# Patient Record
Sex: FEMALE | Race: OTHER | NOT HISPANIC OR LATINO
[De-identification: names, ages, dates, MRNs, and addresses within clinical notes are randomized per-mention and may not be internally consistent; named-entity substitution may affect disease eponyms.]

---

## 2017-05-09 ENCOUNTER — OTHER (OUTPATIENT)
Age: 62
End: 2017-05-09

## 2017-05-09 RX ORDER — SODIUM PICOSULFATE, MAGNESIUM OXIDE, AND ANHYDROUS CITRIC ACID 10; 3.5; 12 MG/16.2G; G/16.2G; G/16.2G
10-3.5-12 POWDER, METERED ORAL
Qty: 1 | Refills: 0 | Status: ACTIVE | COMMUNITY
Start: 2017-05-09 | End: 1900-01-01

## 2017-05-15 ENCOUNTER — APPOINTMENT (OUTPATIENT)
Dept: SURGERY | Facility: CLINIC | Age: 62
End: 2017-05-15

## 2017-05-15 DIAGNOSIS — C73 MALIGNANT NEOPLASM OF THYROID GLAND: ICD-10-CM

## 2017-05-15 DIAGNOSIS — C22.9 MALIGNANT NEOPLASM OF LIVER, NOT SPECIFIED AS PRIMARY OR SECONDARY: ICD-10-CM

## 2017-05-15 DIAGNOSIS — Z78.9 OTHER SPECIFIED HEALTH STATUS: ICD-10-CM

## 2019-01-24 ENCOUNTER — APPOINTMENT (OUTPATIENT)
Dept: UROLOGY | Facility: CLINIC | Age: 64
End: 2019-01-24
Payer: COMMERCIAL

## 2019-01-24 DIAGNOSIS — N28.1 CYST OF KIDNEY, ACQUIRED: ICD-10-CM

## 2019-01-24 DIAGNOSIS — R35.0 FREQUENCY OF MICTURITION: ICD-10-CM

## 2019-01-24 PROCEDURE — 99204 OFFICE O/P NEW MOD 45 MIN: CPT | Mod: 25

## 2019-01-24 PROCEDURE — 51798 US URINE CAPACITY MEASURE: CPT

## 2019-01-25 LAB
APPEARANCE: CLEAR
BACTERIA: NEGATIVE
BILIRUBIN URINE: ABNORMAL
BLOOD URINE: NEGATIVE
CALCIUM OXALATE CRYSTALS: ABNORMAL
COLOR: ABNORMAL
GLUCOSE QUALITATIVE U: NEGATIVE MG/DL
HYALINE CASTS: 6 /LPF
KETONES URINE: NEGATIVE
LEUKOCYTE ESTERASE URINE: ABNORMAL
MICROSCOPIC-UA: NORMAL
NITRITE URINE: POSITIVE
PH URINE: 5.5
PROTEIN URINE: 30 MG/DL
RED BLOOD CELLS URINE: 2 /HPF
SPECIFIC GRAVITY URINE: 1.03
SQUAMOUS EPITHELIAL CELLS: 2 /HPF
UROBILINOGEN URINE: 1 MG/DL
WHITE BLOOD CELLS URINE: 1 /HPF

## 2019-01-26 LAB — BACTERIA UR CULT: NORMAL

## 2019-02-14 ENCOUNTER — APPOINTMENT (OUTPATIENT)
Dept: UROLOGY | Facility: CLINIC | Age: 64
End: 2019-02-14
Payer: COMMERCIAL

## 2019-02-14 ENCOUNTER — OUTPATIENT (OUTPATIENT)
Dept: OUTPATIENT SERVICES | Facility: HOSPITAL | Age: 64
LOS: 1 days | End: 2019-02-14
Payer: COMMERCIAL

## 2019-02-14 VITALS
BODY MASS INDEX: 26.67 KG/M2 | DIASTOLIC BLOOD PRESSURE: 79 MMHG | RESPIRATION RATE: 16 BRPM | SYSTOLIC BLOOD PRESSURE: 143 MMHG | WEIGHT: 176 LBS | HEART RATE: 56 BPM | HEIGHT: 68 IN | TEMPERATURE: 98.3 F

## 2019-02-14 DIAGNOSIS — D30.00 BENIGN NEOPLASM OF UNSPECIFIED KIDNEY: ICD-10-CM

## 2019-02-14 DIAGNOSIS — R35.0 FREQUENCY OF MICTURITION: ICD-10-CM

## 2019-02-14 PROCEDURE — 99214 OFFICE O/P EST MOD 30 MIN: CPT | Mod: 25

## 2019-02-14 PROCEDURE — 52000 CYSTOURETHROSCOPY: CPT

## 2019-02-15 DIAGNOSIS — D30.00 BENIGN NEOPLASM OF UNSPECIFIED KIDNEY: ICD-10-CM

## 2019-02-15 LAB
APPEARANCE: CLEAR
BACTERIA: NEGATIVE
BILIRUBIN URINE: NEGATIVE
BLOOD URINE: NEGATIVE
COLOR: YELLOW
GLUCOSE QUALITATIVE U: NEGATIVE MG/DL
HYALINE CASTS: 1 /LPF
KETONES URINE: NEGATIVE
LEUKOCYTE ESTERASE URINE: NEGATIVE
MICROSCOPIC-UA: NORMAL
NITRITE URINE: NEGATIVE
PH URINE: 5.5
PROTEIN URINE: NEGATIVE MG/DL
RED BLOOD CELLS URINE: 4 /HPF
SPECIFIC GRAVITY URINE: 1.03
SQUAMOUS EPITHELIAL CELLS: 11 /HPF
UROBILINOGEN URINE: NEGATIVE MG/DL
WHITE BLOOD CELLS URINE: 5 /HPF

## 2019-02-24 LAB — BACTERIA UR CULT: NORMAL

## 2024-02-22 ENCOUNTER — APPOINTMENT (OUTPATIENT)
Dept: SURGERY | Facility: CLINIC | Age: 69
End: 2024-02-22
Payer: MEDICARE

## 2024-02-22 VITALS
HEART RATE: 82 BPM | HEIGHT: 68 IN | BODY MASS INDEX: 28.64 KG/M2 | WEIGHT: 189 LBS | TEMPERATURE: 97.1 F | SYSTOLIC BLOOD PRESSURE: 120 MMHG | RESPIRATION RATE: 16 BRPM | DIASTOLIC BLOOD PRESSURE: 79 MMHG | OXYGEN SATURATION: 99 %

## 2024-02-22 DIAGNOSIS — C18.9 MALIGNANT NEOPLASM OF COLON, UNSPECIFIED: ICD-10-CM

## 2024-02-22 DIAGNOSIS — G25.81 RESTLESS LEGS SYNDROME: ICD-10-CM

## 2024-02-22 PROCEDURE — 99203 OFFICE O/P NEW LOW 30 MIN: CPT

## 2024-02-22 RX ORDER — OMEPRAZOLE 20 MG/1
TABLET, ORALLY DISINTEGRATING, DELAYED RELEASE ORAL
Refills: 0 | Status: ACTIVE | COMMUNITY

## 2024-02-22 NOTE — HISTORY OF PRESENT ILLNESS
[FreeTextEntry1] : Jennifer is a 69 y/o female here for consultation, abdominal hernia  h/o Stage IV Colon cancer, Dx in 2005 s/p left hemicolectomy 11/2005 mets to liver s/p left lateral segmentectomy with placement of hepatic artery pump and cholecystectomy ([facility]" in 2009.  Adjuvant tx with pump and systemic chemotherapy with Folfox was completed by Dr. Meza in March 2010.  The hepatic artery pump was removed on 10/3/17.    Last seen by Dr. aDily 12/03/2007 due to right groin swelling and discomfort CT ordered  Colonoscopy 05/15/17 by Dr. Turner - Diverticulosis of the sigmoid colon.  Normal mucosa in the rectum, sigmoid colon, descending colon, transverse colon, ascending colon and cecum.    CT A/P 11/1/22 - Since April 20, 2022, slightly increased complex cystic right renal mass.  No metastatic disease.    US (renal mass) 11/3/23 - Since April 20, 23, unchanged cystic renal mass.    CT _______ will bring    [de-identified] : Jennifer is a 67 y/o female here for a consultation visit, abdominal pain.   H/O colon cancer Dx in 2005 s/p left colectomyh in 2005, developed liver metastasis, underwent a left lateral segmentectomy with placement of a hepatic artery pump (removed on 10/3/17) & Cholecystectomy ([facility]) in 2009.  Adjuvant tx  with pump and systemic chemo w FOLFOX was completed in 03/2010.    Colonoscopy 05/15/17 by Dr. Turner (Colon-CA, unspecified site) - Diverticulosis of the sigmoid colon.  Normal mucosa in the rectum, sigmoid colon, descending colon, transverse colon, ascending colon and cecum.    CT A/P 11/3/23 (colon cancer. evaluate extent of disease) - Since November 1, 2022, slight increase complex right renal mass.  No new suspicious findings.    US Renal/Retro/Bladder 11/3/23 (renal mass) - Since April 20, 2023, unchanged cystic right renal mass.

## 2024-02-22 NOTE — HISTORY OF PRESENT ILLNESS
[FreeTextEntry1] : Jennifer is a 69 y/o female here for consultation, abdominal hernia  h/o Stage IV Colon cancer, Dx in 2005 s/p left hemicolectomy 11/2005 mets to liver s/p left lateral segmentectomy with placement of hepatic artery pump and cholecystectomy ([facility]" in 2009.  Adjuvant tx with pump and systemic chemotherapy with Folfox was completed by Dr. Meza in March 2010.  The hepatic artery pump was removed on 10/3/17.    Last seen by Dr. Daily 12/03/2007 due to right groin swelling and discomfort CT ordered  Colonoscopy 05/15/17 by Dr. Turner - Diverticulosis of the sigmoid colon.  Normal mucosa in the rectum, sigmoid colon, descending colon, transverse colon, ascending colon and cecum.    CT A/P 11/1/22 - Since April 20, 2022, slightly increased complex cystic right renal mass.  No metastatic disease.    US (renal mass) 11/3/23 - Since April 20, 23, unchanged cystic renal mass.    CT _______ will bring    [de-identified] : Jennifer is a 69 y/o female here for a consultation visit, abdominal pain.   H/O colon cancer Dx in 2005 s/p left colectomyh in 2005, developed liver metastasis, underwent a left lateral segmentectomy with placement of a hepatic artery pump (removed on 10/3/17) & Cholecystectomy ([facility]) in 2009.  Adjuvant tx  with pump and systemic chemo w FOLFOX was completed in 03/2010.    Colonoscopy 05/15/17 by Dr. Turner (Colon-CA, unspecified site) - Diverticulosis of the sigmoid colon.  Normal mucosa in the rectum, sigmoid colon, descending colon, transverse colon, ascending colon and cecum.    CT A/P 11/3/23 (colon cancer. evaluate extent of disease) - Since November 1, 2022, slight increase complex right renal mass.  No new suspicious findings.    US Renal/Retro/Bladder 11/3/23 (renal mass) - Since April 20, 2023, unchanged cystic right renal mass.

## 2024-02-22 NOTE — ASSESSMENT
[FreeTextEntry1] : I have seen and evaluated patient and have corroborated all nursing input into this note.  Patient has had recent crampy abdominal pain which is improved.  She has a history of metastatic colon cancer and her last colonoscopy was 2017.  A recent CT scan was unremarkable except for the known septated renal cyst that is followed by oncology and urology.  I recommended a follow-up exam.  Indications, risks, benefits, alternatives reviewed including but not limited to bleeding, perforation, and incomplete exam.  All questions answered.

## 2024-02-22 NOTE — PHYSICAL EXAM
[Abdomen Tenderness] : ~T No ~M abdominal tenderness [Abdomen Masses] : No abdominal masses [de-identified] : Healed wounds.

## 2024-02-22 NOTE — PHYSICAL EXAM
[Abdomen Tenderness] : ~T No ~M abdominal tenderness [Abdomen Masses] : No abdominal masses [de-identified] : Healed wounds.

## 2024-02-25 NOTE — HISTORY OF PRESENT ILLNESS
[FreeTextEntry1] : Jennifer is a 67 y/o female here for a consultation visit, abdominal pain.  H/O colon cancer Dx in 2005 s/p left colectomyh in 2005, developed liver metastasis, underwent a left lateral segmentectomy with placement of a hepatic artery pump (removed on 10/3/17) & Cholecystectomy ([facility]) in 2009. Adjuvant tx with pump and systemic chemo w FOLFOX was completed in 03/2010.  Colonoscopy 05/15/17 by Dr. Turner (Colon-CA, unspecified site) - Diverticulosis of the sigmoid colon. Normal mucosa in the rectum, sigmoid colon, descending colon, transverse colon, ascending colon and cecum.  CT A/P 11/3/23 (colon cancer. evaluate extent of disease) - Since November 1, 2022, slight increase complex right renal mass. No new suspicious findings.  US Renal/Retro/Bladder 11/3/23 (renal mass) - Since April 20, 2023, unchanged cystic right renal mass.

## 2024-02-27 DIAGNOSIS — Z12.11 ENCOUNTER FOR SCREENING FOR MALIGNANT NEOPLASM OF COLON: ICD-10-CM

## 2024-02-27 RX ORDER — ATORVASTATIN CALCIUM 10 MG/1
10 TABLET, FILM COATED ORAL
Refills: 0 | Status: ACTIVE | COMMUNITY

## 2024-02-29 RX ORDER — SODIUM SULFATE, POTASSIUM SULFATE AND MAGNESIUM SULFATE 1.6; 3.13; 17.5 G/177ML; G/177ML; G/177ML
17.5-3.13-1.6 SOLUTION ORAL
Qty: 1 | Refills: 0 | Status: ACTIVE | COMMUNITY
Start: 2024-02-29 | End: 1900-01-01

## 2024-03-12 NOTE — REASON FOR VISIT
New patient to us established to the practice comes the office complaining of middle back pain between her shoulder blades right around the bra strap level.  It is kind of started after doing some physical therapy after her knee replacement.  She denies radiculopathy paresthesia fever chills nausea vomiting or night sweats.  Denies bowel or bladder complaints, denies any real trauma accidents or falls to cause it.  No spine surgeries in the past.    Checked patient's recent blood work.  Hemoglobin A1c was 5.4.  We also checked a vitamin D level 36 checked a metabolic panel.  Showing glucose 77 sodium 143 potassium 4.0.  We checked the primary doctors note medication list.  And patient is not on any type of statin medication she also had a bone density study back in 2022 which we reviewed showing diagnosis of osteopenia.    Physical exam: Well-nourished, well kept.  No lymphangitis or lymphadenopathy in the examined extremities.  Good perfusion to the extremities ×4.  Radial and dorsalis pedis pulses 2+.  Capillary refill to all 4 digits brisk.  No distal edema x 4.  Gait normal.  Can walk on heels and toes.  Examination of the neck reveals no swelling, step-off, or point tenderness.  Range of motion with flexion, extension, side bending and rotation is well maintained without crepitance, instability, or exacerbation of pain.  Strength is within normal limits.  There is decreased range of motion flexion extension rotation thoracic lumbar spine tender in the upper thoracic region good strength no instability examination of the upper extremities reveals no point tenderness, swelling, or deformity.  Range of motion of the shoulders, elbows, wrists, and fingers are all full without crepitance, instability, or exacerbation of pain.  Strength is 5/5 throughout.  Examination of the lower extremities reveals no point tenderness, swelling, or deformity.  Range of motion of the hips, knees, and ankles are full without  crepitance, instability, or exacerbation of pain.  Strength is 5/5 throughout.  No redness, abrasions, or lesions on all 4 extremities, head and neck, or trunk.  Gross sensation intact in the extremities ×4.  Deep tendon reflexes 2+ and symmetric bilaterally.  Sree, clonus, and Babinski were negative.  Straight leg raise negative.  Affect normal.  Alert and oriented ×3.  Coordination normal.    X-ray: X-ray thoracic spine taken today shows a kyphotic look at the upper thoracic region.  Right around T7 or T8.  Looks like there is a mild compression of T7 or T8 not quite sure what level that is.  A lot of arthritic degenerative changes.    Assessment: This a patient has been doing physical therapy for her knees which is aggravating the middle of her back and I have concerns for a compression fracture.  I told her to hold off on the physical therapy for now.  We are going to try a little medication.  And get a stat MRI for evaluation of the compression fracture if there is an acute fracture there she will hold off on physical therapy and will give this time to heal we talked about a kyphoplasty procedure and giving this some time to heal and adjacent level fractures if she has a kyphoplasty    Plan: We will try some medication and we will get a stat MRI at any  facility I will call her with results   [Consultation] : a consultation visit

## 2024-04-03 RX ORDER — SODIUM PICOSULFATE, MAGNESIUM OXIDE, AND ANHYDROUS CITRIC ACID 12; 3.5; 1 G/175ML; G/175ML; MG/175ML
10-3.5-12 MG-GM LIQUID ORAL
Qty: 1 | Refills: 0 | Status: ACTIVE | COMMUNITY
Start: 2024-02-27 | End: 1900-01-01

## 2024-04-16 ENCOUNTER — APPOINTMENT (OUTPATIENT)
Dept: SURGERY | Facility: CLINIC | Age: 69
End: 2024-04-16
Payer: MEDICARE

## 2024-04-16 PROCEDURE — 45380 COLONOSCOPY AND BIOPSY: CPT

## 2024-06-05 DIAGNOSIS — R19.7 DIARRHEA, UNSPECIFIED: ICD-10-CM

## 2024-08-08 ENCOUNTER — NON-APPOINTMENT (OUTPATIENT)
Age: 69
End: 2024-08-08

## 2025-01-23 ENCOUNTER — NON-APPOINTMENT (OUTPATIENT)
Age: 70
End: 2025-01-23

## 2025-03-10 ENCOUNTER — NON-APPOINTMENT (OUTPATIENT)
Age: 70
End: 2025-03-10

## 2025-04-15 ENCOUNTER — NON-APPOINTMENT (OUTPATIENT)
Age: 70
End: 2025-04-15

## 2025-05-01 ENCOUNTER — NON-APPOINTMENT (OUTPATIENT)
Age: 70
End: 2025-05-01